# Patient Record
Sex: FEMALE | URBAN - METROPOLITAN AREA
[De-identification: names, ages, dates, MRNs, and addresses within clinical notes are randomized per-mention and may not be internally consistent; named-entity substitution may affect disease eponyms.]

---

## 2020-11-10 ENCOUNTER — APPOINTMENT (RX ONLY)
Dept: URBAN - METROPOLITAN AREA CLINIC 39 | Facility: CLINIC | Age: 65
Setting detail: DERMATOLOGY
End: 2020-11-10

## 2020-11-10 DIAGNOSIS — D22 MELANOCYTIC NEVI: ICD-10-CM

## 2020-11-10 DIAGNOSIS — L73.8 OTHER SPECIFIED FOLLICULAR DISORDERS: ICD-10-CM

## 2020-11-10 DIAGNOSIS — Z71.89 OTHER SPECIFIED COUNSELING: ICD-10-CM

## 2020-11-10 DIAGNOSIS — L82.0 INFLAMED SEBORRHEIC KERATOSIS: ICD-10-CM

## 2020-11-10 DIAGNOSIS — L82.1 OTHER SEBORRHEIC KERATOSIS: ICD-10-CM

## 2020-11-10 DIAGNOSIS — L81.4 OTHER MELANIN HYPERPIGMENTATION: ICD-10-CM

## 2020-11-10 PROBLEM — D48.5 NEOPLASM OF UNCERTAIN BEHAVIOR OF SKIN: Status: ACTIVE | Noted: 2020-11-10

## 2020-11-10 PROBLEM — D22.62 MELANOCYTIC NEVI OF LEFT UPPER LIMB, INCLUDING SHOULDER: Status: ACTIVE | Noted: 2020-11-10

## 2020-11-10 PROBLEM — D22.61 MELANOCYTIC NEVI OF RIGHT UPPER LIMB, INCLUDING SHOULDER: Status: ACTIVE | Noted: 2020-11-10

## 2020-11-10 PROCEDURE — ? COUNSELING

## 2020-11-10 PROCEDURE — ? BIOPSY BY SHAVE METHOD

## 2020-11-10 PROCEDURE — ? EDUCATIONAL RESOURCES PROVIDED

## 2020-11-10 PROCEDURE — 11102 TANGNTL BX SKIN SINGLE LES: CPT

## 2020-11-10 PROCEDURE — 99203 OFFICE O/P NEW LOW 30 MIN: CPT | Mod: 25

## 2020-11-10 PROCEDURE — ? LIQUID NITROGEN (COSMETIC)

## 2020-11-10 ASSESSMENT — LOCATION SIMPLE DESCRIPTION DERM
LOCATION SIMPLE: RIGHT UPPER BACK
LOCATION SIMPLE: LEFT UPPER ARM
LOCATION SIMPLE: RIGHT LOWER BACK
LOCATION SIMPLE: LEFT UPPER BACK
LOCATION SIMPLE: LEFT CHEEK
LOCATION SIMPLE: RIGHT UPPER ARM
LOCATION SIMPLE: LEFT LOWER BACK
LOCATION SIMPLE: ABDOMEN

## 2020-11-10 ASSESSMENT — LOCATION DETAILED DESCRIPTION DERM
LOCATION DETAILED: LEFT INFERIOR MEDIAL MIDBACK
LOCATION DETAILED: LEFT INFERIOR UPPER BACK
LOCATION DETAILED: RIGHT SUPERIOR MEDIAL MIDBACK
LOCATION DETAILED: RIGHT MEDIAL UPPER BACK
LOCATION DETAILED: LEFT MID-UPPER BACK
LOCATION DETAILED: RIGHT ANTERIOR PROXIMAL UPPER ARM
LOCATION DETAILED: RIGHT INFERIOR UPPER BACK
LOCATION DETAILED: LEFT ANTERIOR PROXIMAL UPPER ARM
LOCATION DETAILED: LEFT SUPERIOR UPPER BACK
LOCATION DETAILED: LEFT INFERIOR MEDIAL MALAR CHEEK
LOCATION DETAILED: EPIGASTRIC SKIN

## 2020-11-10 ASSESSMENT — LOCATION ZONE DERM
LOCATION ZONE: FACE
LOCATION ZONE: ARM
LOCATION ZONE: TRUNK

## 2020-11-10 NOTE — PROCEDURE: LIQUID NITROGEN (COSMETIC)
Render Post-Care Instructions In Note?: yes
Consent: The patient's consent was obtained including but not limited to risks of crusting, scabbing, blistering, scarring, darker or lighter pigmentary change, recurrence, incomplete removal and infection. The patient understands that the procedure is cosmetic in nature and is not covered by insurance.
Detail Level: Detailed
Price (Use Numbers Only, No Special Characters Or $): 150.00
Post-Care Instructions: I reviewed with the patient in detail post-care instructions. Patient is to wear sunprotection, and avoid picking at any of the treated lesions. Pt may apply Vaseline to crusted or scabbing areas.
Billing Information: Bill by Static Price

## 2021-05-31 ENCOUNTER — HOSPITAL ENCOUNTER (EMERGENCY)
Dept: HOSPITAL 75 - ER FS | Age: 66
Discharge: HOME | End: 2021-05-31
Payer: MEDICARE

## 2021-05-31 VITALS — HEIGHT: 66.02 IN | BODY MASS INDEX: 27.53 KG/M2 | WEIGHT: 171.3 LBS

## 2021-05-31 VITALS — SYSTOLIC BLOOD PRESSURE: 115 MMHG | DIASTOLIC BLOOD PRESSURE: 71 MMHG

## 2021-05-31 DIAGNOSIS — S80.01XA: ICD-10-CM

## 2021-05-31 DIAGNOSIS — S62.343A: ICD-10-CM

## 2021-05-31 DIAGNOSIS — Z87.81: ICD-10-CM
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
## 2021-05-31 DIAGNOSIS — S62.341A: ICD-10-CM
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
## 2021-05-31 DIAGNOSIS — S00.01XA: ICD-10-CM

## 2021-05-31 DIAGNOSIS — S09.90XA: Primary | ICD-10-CM

## 2021-05-31 DIAGNOSIS — S50.811A: ICD-10-CM

## 2021-05-31 DIAGNOSIS — Z23: ICD-10-CM

## 2021-05-31 PROCEDURE — 73110 X-RAY EXAM OF WRIST: CPT

## 2021-05-31 PROCEDURE — 73562 X-RAY EXAM OF KNEE 3: CPT

## 2021-05-31 PROCEDURE — 90715 TDAP VACCINE 7 YRS/> IM: CPT

## 2021-05-31 PROCEDURE — 73130 X-RAY EXAM OF HAND: CPT

## 2021-05-31 PROCEDURE — 12001 RPR S/N/AX/GEN/TRNK 2.5CM/<: CPT

## 2021-05-31 PROCEDURE — 29125 APPL SHORT ARM SPLINT STATIC: CPT

## 2021-05-31 NOTE — DIAGNOSTIC IMAGING REPORT
INDICATION:  Wrist and hand pain post fall



TECHNIQUE:  Three views of the left hand.



CORRELATION STUDY:  None



FINDINGS: 

Internal fixation with plate and screws over the distal volar

aspect of the radius. Carpal bones appear to be intact as

visualized. Degenerative change of the 1st carpometacarpal

articulation.



The relatively nondisplaced fractures at the base of the 2nd, 3rd

and potentially 4th metacarpals. Alignment appears be

near-anatomic. The phalanges are intact with mild degenerative

changes through the interphalangeal joint.  Soft tissues are

unremarkable.



IMPRESSION: 

1. Relatively nondisplaced fractures at the base of the 2nd and

3rd and potentially 4th metacarpals.



Dictated by: 



  Dictated on workstation # [id number]

## 2021-05-31 NOTE — DIAGNOSTIC IMAGING REPORT
INDICATION:  Pain post fall today.



TECHNIQUE:  3 views of the right knee  



CORRELATION STUDY:  None



FINDINGS: 

The joint spaces are maintained. The articular surfaces are

smooth and preserved. There is no acute bony abnormality.    Soft

tissues are unremarkable.



IMPRESSION: 

1.  Negative for acute bony abnormality of the knee.



Dictated by: 



  Dictated on workstation # [id number]

## 2021-05-31 NOTE — DIAGNOSTIC IMAGING REPORT
INDICATION: Pain, fall



Post surgical change to the distal radius present. Hardware

appeared intact. No articular offset. An acute appearing fracture

of the base of the 2nd metacarpal is present. Proximal and distal

carpal rows themselves intact. 



IMPRESSION: Fracture at the base of the 2nd metacarpal. The

postsurgical distal radius intact.



Dictated by: 



  Dictated on workstation # [id number]

## 2021-05-31 NOTE — ED UPPER EXTREMITY
General


Chief Complaint:  Upper Extremity


Stated Complaint:  LT WRIST INJ


Source:  patient





History of Present Illness


Date Seen by Provider:  May 31, 2021


Time Seen by Provider:  07:26


Initial Comments


65 yo female presenting with pain in left wrist/hand, left forehead/scalp, right

knee, right forearm after having a fall just pta.  She states that she was 

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 

tripped and fell.  She was just getting ready to go through the door when she 

tripped.  She hit her head against the wall and caught her hand and wrist 

against the wall and the drawer of jewelry that she was carrying.  She states 

that she also has some pain to the kneecap on the right side.  She has a small 

abrasion to her right forearm.  She had previously fallen on the ice about 3 

years ago and had surgery on her left wrist with hardware that remains in place.

 Today her pain is coming from the wrist and the base of the thumb up into her 

thumb and index finger primarily.  She has some pain to her left forehead and 

scalp and was dazed after hitting her head but did not actually lose 

consciousness.  She denies any change in vision.  She has no nausea or vomiting.

 She has not taken anything for pain prior to arrival in the ED.  She states her

last tetanus was more than 5 years and she cannot remember for sure when it was 

that she had a tetanus shot.  She reports avoiding Aleve or naproxen because it 

caused her to have hives but she can take other anti-inflammatories and has had 

Toradol shots in the past.  She notes that she has had multiple fractures in the

past and been told that she had weak bones.  She has already had surgery on her 

left wrist and right ankle as well as her spine.


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 


Onset:  just prior to arrival


Severity:  moderate


Pain/Injury Location:  right forearm (Mild to the right forearm around where she

has the deep abrasion); left wrist, left hand, left thumb, left 2nd finger; 

right other (Kneecap)


Method of Injury:  fell (Tripped as she was trying to go out the door while 

carrying a drawer of jewerly)


Modifying Factors:  Worse With Movement





Allergies and Home Medications


Allergies


Coded Allergies:  


     naproxen (Verified  Allergy, Unknown, Hives, 21)





Home Medications


Tramadol HCl 50 Mg Tablet, 50 MG PO Q6H PRN for PAIN


   Prescribed by: ANATOLIY ELIAS on 21 3067





Patient Home Medication List


Home Medication List Reviewed:  Yes





Review of Systems


Constitutional:  No chills, No dizziness, No fever


EENTM:  No ear discharge, No ear pain, No blurred vision, No double vision, No 

vision loss, No epistaxis, No nose congestion


Respiratory:  no symptoms reported


Cardiovascular:  no symptoms reported


Gastrointestinal:  No nausea, No vomiting


Genitourinary:  no symptoms reported


Musculoskeletal:  see HPI


Skin:  see HPI


Psychiatric/Neurological:  See HPI, Headache (mild to left forehead and scalp); 

Denies Numbness, Denies Paresthesia





Past Medical-Social-Family Hx


Past Med/Social Hx:  Reviewed Nursing Past Med/Soc Hx


Patient Social History


Alcohol Use:  Denies Use


Smoking Status:  Never a Smoker


2nd Hand Smoke Exposure:  No


Recent Hopitalizations:  No





Immunizations Up To Date


Tetanus Booster (TDap):  More than 5yrs





Seasonal Allergies


Seasonal Allergies:  No





Past Medical History


Surgeries:  Yes (Left wrist, Back, right ankle)


Bladder Surgery, Hysterectomy, Orthopedic


Respiratory:  No


Cardiac:  No


Neurological:  No


Genitourinary:  No


Gastrointestinal:  No


Musculoskeletal:  No


Endocrine:  No


HEENT:  No


Cancer:  No


Psychosocial:  Yes


Anxiety


Integumentary:  No


Blood Disorders:  No





Physical Exam


Vital Signs





Vital Signs - First Documented








 21





 07:25


 


Temp 36.2


 


Pulse 68


 


Resp 16


 


B/P (MAP) 115/71 (86)


 


Pulse Ox 99


 


O2 Delivery Room Air





Capillary Refill :


Height, Weight, BMI


Height: '"


Weight: lbs. oz. kg;  BMI


Method:


General Appearance:  WD/WN, no apparent distress


HEENT:  PERRL/EOMI, TMs normal; No photophobia; other (superficial abrasion to 

left forehead into scalp. mild tenderness to palpation in this area but no step 

off or crepitus. No CSF otorrhea or rhinorrhea. Negative Suero and Raccoon sig

n)


Neck:  non-tender, full range of motion, supple, normal inspection


Cardiovascular:  normal peripheral pulses, regular rate, rhythm


Respiratory:  chest non-tender, lungs clear, normal breath sounds


Shoulder:  normal inspection, non-tender


Elbow/Forearm:  abrasions (deep abrasion to right proximal forearm)


Wrist:  No deformity; Yes limited ROM (left due to pain), Yes pain (left wrist 

at base of thumb)


Hand:  Left, limited ROM (left thumb and index finger limited ROM due to pain), 

soft tissue tenderness (left thumb, index finger and wrist)


Neurologic/Tendon:  normal sensation, normal tendon functions


Neurologic/Psychiatric:  CNs II-XII nml as tested, no motor/sensory deficits, 

alert, oriented x 3


Skin:  normal color, warm/dry


right knee with mild tenderness to patella but normal range of motion and no 

laxity with drawer maneuvers or varus/valgus stressing. No ecchymosis or crep

itus.





Procedures/Interventions





   Wound Location:  Upper Extremities (right forearm)


   Wound Length (cm):  1.6


   Wound's Depth, Shape:  linear, sub Q


   Wound Explored:  clean


   Other Closure Supply:  Steri Strip 1/", Mastisol, Wound Adhesive


Progress


After obtaining verbal consent from the patient the wound was cleaned with 

surgical scrub soap of chlorhexidine and sterile water.  Then using Mastisol 

around the wound to help secure Steri-Strips the wound edges were approximated 

and secured with tissue adhesive.  Then the Steri-Strips were applied over the 

tissue adhesive.  The wound edges were well approximated.  Patient tolerated 

procedure well without any immediate complication.  Counseled on follow-up and 

return precautions.


Splinting and Joint Reduction :  


   Location:  Left hand and wrist


   Pre-Proc Neuro Vasc Exam:  normal


   Post-Proc Neuro Vasc Exam:  normal


Progress


Aluminum/foam splint was applied to the left hand and forearm.  It was secured 

with the Ace bandage.  Patient was neurovascularly intact both pre and post 

procedure.  Counseled to keep the splint on at all times until seen by hand 

specialist.  Try to elevate above heart level to help with swelling and pain.  

Ice 20 to 30 minutes every few hours for pain and swelling.  Rewrap the splint 

if having numbness in the fingers or losing circulation and fingers are turning 

purple or painful.


   Ace wrap:  Yes


   Splints:  Colles Wrist





Progress/Results/Core Measures


Results/Orders


My Orders





Orders - ANATOLIY ELIAS MD


Hand 3 View Left (21 07:36)


Wrist 3 View Left (21 07:36)


Knee 3 View Right (21 07:36)


Dipht,Pertuss(Acell),Tet Adult (Boostrix (21 07:45)


Ice: Apply To Affected Area (21 07:38)


Suture Set At Bedside (21 07:38)


Lidocaine 1% Inj 20 Ml (Xylocaine 1% Inj (21 07:38)


Ed Ortho/Other Supplies Order (21 08:47)


Orthopedic Equiment (21 08:47)





Medications Given in ED





Current Medications








 Medications  Dose


 Ordered  Sig/Cornel


 Route  Start Time


 Stop Time Status Last Admin


Dose Admin


 


 Diphtheria/


 Tetanus/Acell


 Pertussis  0.5 ml  ONCE ONCE


 IM  21 07:45


 21 07:46 DC 21 08:27


0.5 ML








Vital Signs/I&O











 21





 07:25 09:07


 


Temp 36.2 36.2


 


Pulse 68 68


 


Resp 16 16


 


B/P (MAP) 115/71 (86) 115/71 (86)


 


Pulse Ox 99 99


 


O2 Delivery Room Air 











Progress


Progress Note #1:  


Progress Note


Order xrays of the left hand/wrist, right knee. Ice for pain and swelling. 

Offered Toradol but pt wanted to wait until imaging done to see if she needed 

something stronger. Will update dTap. Clean and close wound on right forearm. 

Will attempt with steri strips and possibly some tissue adhesive since it is not

very deep or wide but if not closing then will need to place a couple of 

stitches.


Progress Note #2:  


Progress Note


X-rays do demonstrate fracture of the second and third metacarpals of the left 

hand.  They appear to be in good position and nondisplaced.  She has no obvious 

fracture of the right knee or in the wrist.  The hardware from previous surgery 

of the left distal radius appears intact and in place.  After splinting the 

patient requested to continue to use Tylenol and ibuprofen for pain control.  A 

prescription for few tramadol medication pills [facility] in 

case she needs something stronger for pain.  Advised to call her hand surgeon as

she plans to follow-up with the doctor that did her surgery on her wrist 3 years

ago.  Counseled to call and be seen within the next week and to keep the splint 

on clean and dry until then.  Copies of the x-rays were sent on disc format so 

she could have those for the specialist.


Deep abrasion to the right forearm was repaired with tissue adhesive and Steri-

Strips.  Tetanus was updated.





Diagnostic Imaging





   Diagonstic Imaging:  Xray


   Plain Films/CT/US/NM/MRI:  hand


Comments


NAME:   DURAN SORENSON


MED REC#:   [id number]


ACCOUNT#:   [id number]


PT STATUS:   REG ER


:   1955


PHYSICIAN:   ANATOLIY ELIAS MD


ADMIT DATE:   21/ER FS


                                   ***Draft***


Date of Exam:21





HAND 3 VIEW LEFT








INDICATION:  Wrist and hand pain post fall





TECHNIQUE:  Three views of the left hand.





CORRELATION STUDY:  None





FINDINGS: 


Internal fixation with plate and screws over the distal volar


aspect of the radius. Carpal bones appear to be intact as


visualized. Degenerative change of the 1st carpometacarpal


articulation.





The relatively nondisplaced fractures at the base of the 2nd, 3rd


and potentially 4th metacarpals. Alignment appears be


near-anatomic. The phalanges are intact with mild degenerative


changes through the interphalangeal joint.  Soft tissues are


unremarkable.





IMPRESSION: 


1. Relatively nondisplaced fractures at the base of the 2nd and


3rd and potentially 4th metacarpals.





  Dictated on workstation # [id number]








Dict:   21 0803


Trans:   21 0806


KASIA [id number]





Interpreted by:     KEV RAMIREZ DO


Electronically signed by:








   Diagonstic Imaging:  Xray


   Plain Films/CT/US/NM/MRI:  knee


Comments


                 [facility] [place], [place]





NAME:   DURAN SORENSON


MED REC#:   [id number]


ACCOUNT#:   [id number]


PT STATUS:   REG ER


:   1955


PHYSICIAN:   ANATOLIY ELIAS MD


ADMIT DATE:   21/ER FS


                                   ***Draft***


Date of Exam:21





KNEE 3 VIEW RIGHT








INDICATION:  Pain post fall today.





TECHNIQUE:  3 views of the right knee  





CORRELATION STUDY:  None





FINDINGS: 


The joint spaces are maintained. The articular surfaces are


smooth and preserved. There is no acute bony abnormality.    Soft


tissues are unremarkable.





IMPRESSION: 


1.  Negative for acute bony abnormality of the knee.





  Dictated on workstation # [id number]








Dict:   21 0804


Trans:   21 0805


DO [id number]





Interpreted by:     KEV RAMIREZ DO


Electronically signed by:








   Pal Imaging:  Xray


   Plain Films/CT/US/NM/MRI:  other (wrist)


Comments


                 [facility] [place], [place]





NAME:   DURAN SORENSON


MED REC#:   [id number]


ACCOUNT#:   [id number]


PT STATUS:   REG ER


:   1955


PHYSICIAN:   ANATOLIY ELIAS MD


ADMIT DATE:   21/ER FS


                                   ***Draft***


Date of Exam:21





WRIST 3 VIEW LEFT








INDICATION: Pain, fall





Post surgical change to the distal radius present. Hardware


appeared intact. No articular offset. An acute appearing fracture


of the base of the 2nd metacarpal is present. Proximal and distal


carpal rows themselves intact. 





IMPRESSION: Fracture at the base of the 2nd metacarpal. The


postsurgical distal radius intact.





  Dictated on workstation # [id number]








Dict:   21 0759


Trans:   21 0804


KASIA [id number]





Interpreted by:     GREGORY VEGA


Electronically signed by:





Departure


Impression





   Primary Impression:  


   Closed nondisp fracture of base of second metacarpal bone of left hand


   Qualified Codes:  S62.341A - Nondisplaced fracture of base of second 

   metacarpal bone, left hand, initial encounter for closed fracture


   Additional Impressions:  


   Closed nondisplaced fracture of third metacarpal bone of left hand


   Qualified Codes:  S62.343A - Nondisplaced fracture of base of third 

   metacarpal bone, left hand, initial encounter for closed fracture


   Abrasion of right forearm, initial encounter


   Contusion of right knee, initial encounter


   Fall


   Qualified Codes:  W19.XXXA - Unspecified fall, initial encounter


   Abrasion of scalp, initial encounter


   Closed head injury without loss of consciousness


   Qualified Codes:  S09.90XA - Unspecified injury of head, initial encounter


Disposition:  01 HOME, SELF-CARE


Condition:  Stable





Departure-Patient Inst.


Decision time for Depature:  08:54


Referrals:  


NOEM[facility] CENTENO (PCP)


Primary Care Physician








[facility] (Family)


Primary Care Physician


Patient Instructions:  Abrasions ED, Hand Fracture ED, Laceration Repair With 

Glue ED, Minor Head Injury, Adult ED, Splint Care ED, Minor Contusion ED





Add. Discharge Instructions:  


Keep splint on your hand at all times until seen by Hand Orthopedics doctor. 

Call your doctor that did the surgery for your wrist from previous fall and they

should be able to follow up with you for this.


If you can not get in with that doctor or someone from his group this week then 

you can call your primary provider to get referral to another hand surgeon


If the ace bandage is too tight you can have your family loosen and then re-wrap

the splint.





Ice 20-30 minutes every few hours to help with pain and swelling.


Acetaminophen and Ibuprofen for pain. For severe pain you could take the 

Tramadol.





Stay well hydrated and get plenty of rest. 


You may notice other muscles to get sore and inflamed.





All discharge instructions reviewed with patient and/or family. Voiced 

understanding.


Scripts


Tramadol HCl (Tramadol HCl) 50 Mg Tablet


50 MG PO Q6H PRN for PAIN for 3 Days, #12 TAB 0 Refills


   Prov: ANATOLIY ELIAS MD         21





Images


Extremities-Lower











1 - Contusion (Mild erythema to the patella area for contusion), Tenderness 

(Mild tenderness and small area of erythema for contusion over the patella.  

Negative anterior posterior drawer sign as well as no laxity with varus or 

valgus stress)








Extremities-Upper











1 - 1.6 cm x 4 mm deep abrasion to the right proximal forearm














1 - Tenderness (Pain to the left hand primarily at the base of the thumb and 

going into the index finger.)








Head/Face











1 - Abrasion (Mild superficial abrasion and contusion to the left forehead and 

scalp), Contusion, Swelling (Mild swelling to area of abrasion and contusion.  

There is no step-off or crepitus noted), Tenderness














ANATOLIY ELIAS MD               May 31, 2021 07:54